# Patient Record
(demographics unavailable — no encounter records)

---

## 2024-12-30 NOTE — FAMILY HISTORY
[TextEntry] : Father  of a CVA with prior history of type 2 diabetes hypertension coronary vascular disease and dementia.  Mother congestive heart failure  of pancreatic cancer at age 69.  Maternal aunt  of colon cancer.  Brother physician who had a CVA in his 50s though in the context of severe psychosocial stressors.  He is currently practicing in [place] as a surgeon.

## 2024-12-30 NOTE — PHYSICAL EXAM
[TextEntry] : She is thin well-appearing in no acute distress.  Fluent speech coherent thought processes.  Neck is supple no lymphadenopathy or thyromegaly.  No JVD.  Lungs are clear heart regular rate and rhythm no murmurs rubs gallops.  Abdomen soft nontender nondistended no organomegaly.  No peripheral edema.  Breast exam deferred to her breast surgeon Dr. Witt.  Genital exam deferred to her gynecologist.

## 2024-12-30 NOTE — SOCIAL HISTORY
[TextEntry] : She is never .  In a stable monogamous relationship lives alone.  Works as a .  Though, she lives in [place], spends most of her work life and social life on this and of the [facility].  Very little alcohol use.  No cigarettes.  Exercises sporadic.

## 2024-12-30 NOTE — ASSESSMENT
[FreeTextEntry1] : Given low body mass index will check electrolytes, complete blood count, vitamin B12 levels, lipids and EKG

## 2024-12-30 NOTE — SURGICAL HISTORY
[TextEntry] : Tarsometatarsal fusion for bunion  Bilateral saline breast  Uterine myomectomy  Rhinoplasty

## 2024-12-30 NOTE — HISTORY OF PRESENT ILLNESS
[FreeTextEntry1] : Ms. Allred is here to introduce herself and for an annual physical exam [de-identified] : She has a past medical history of atrophic vaginitis hemorrhoids recurrent HSV infections, constipation predominant irritable bowel syndrome and low body mass index her previous doctor was Jose D Gil MD. She generally feels well and is without significant complaints.  Health maintenance issues well under control: She has annual mammography and exam by Dr. Lissa Witt, a breast surgeon who she follows with given her history of cystic breast disease and longstanding saline breast implants.  She follows for regular colonoscopy with Dr. Freddie Frazier given her history of irritable bowel and prior tubular adenomas.  She is looking for a new gynecologist.  She does get regular skin checks does have a history of melanocytic nevus and is pending definitive management of a lesion just taken from her left arm a few weeks ago.She is up-to-date with usual recommended immunizations including RSV.

## 2025-04-10 NOTE — PHYSICAL EXAM
[TextEntry] : She is well-appearing in no acute distress.  No specific sinus tenderness oral mucosa moist oropharynx is clear.  Neck supple no lymphadenopathy or thyromegaly.  Lungs are clear bilaterally heart regular rate and rhythm no murmurs rubs gallops.

## 2025-04-10 NOTE — HISTORY OF PRESENT ILLNESS
[FreeTextEntry8] : Patient here with complaint of cold symptoms for 2 to 3 days.  Notes onset of fatigue plus cough plus headache and sore throat and bilateral maxillary and frontal sinus pressure.  She says the symptoms are stereotypical for her when she develops a viral URI.  She has no fevers no chills no pleuritic chest pain.  No known sick contacts.  She has a past medical history of low BMI (though has gained weight over the last few months, irritable bowel syndrome, macrocytosis.